# Patient Record
(demographics unavailable — no encounter records)

---

## 2025-02-25 NOTE — IMMUNIZATIONS
[Immunizations are up to date] : Immunizations are up to date [Records maintained by PMISRAEL] : Records maintained by DOMINIK

## 2025-02-25 NOTE — IMMUNIZATIONS
Regarding medication  Trazodone 50 mg once a night patients sister is calling needs this medication called into patients pharmacy     Patients sister said [facility] did not have this medication ready when she picked up patients other medications and pharmacy did not have on file     Pharmacy in [facility]    Please call Cindy when medication is called over    [Immunizations are up to date] : Immunizations are up to date [Records maintained by PMISRAEL] : Records maintained by DOMINIK

## 2025-02-26 NOTE — PHYSICAL EXAM
[PERRLA] : HIRA [S1, S2 Present] : S1, S2 present [Clear to auscultation] : clear to auscultation [Soft] : soft [NonTender] : non tender [Non Distended] : non distended [Normal Bowel Sounds] : normal bowel sounds [No Hepatosplenomegaly] : no hepatosplenomegaly [No Abnormal Lymph Nodes Palpated] : no abnormal lymph nodes palpated [Range Of Motion] : full range of motion [Intact Judgement] : intact judgement  [Insight Insight] : intact insight [Not Examined] : not examined [Refer to Joint Diagram Below] : refer to joint diagram below [Cranial nerves grossly intact] : cranial nerves grossly intact [_______] : Left TMJ: [unfilled] [Acute distress] : no acute distress [Erythematous Conjunctiva] : nonerythematous conjunctiva [Erythematous Oropharynx] : nonerythematous oropharynx [Lesions] : no lesions [Murmurs] : no murmurs [Joint effusions] : no joint effusions [FreeTextEntry1] : appears well  [de-identified] : FROM, no tenderness [de-identified] : no tenderness [de-identified] : no tenderness [de-identified] : no tenderness [de-identified] : tenderness at left quadriceps insertion at patella (2 o'clock) [de-identified] : full forward flexion [de-identified] : no gross discrepancy [de-identified] : none noted

## 2025-02-26 NOTE — HISTORY OF PRESENT ILLNESS
[Noncontributory] : The patient's family history was noncontributory [Unlimited ADLs] : able to do activities of daily living without limitations [Unlimited Sports] : able to participate in sports without limitations [FreeTextEntry1] : Ade is a 17-year-old female who presents for evaluation of positive REYES. She is accompanied by mother and adult sister, who translates for mother (Azeri-speaking).   Ade has been having intermittent joint pain for several years. She has attributed this to being very active and playing a lot of sports. She has had intermittent pain in her back, hips, knees, and ankles. Sometimes she has pain in shoulders. For the last few months, pain seemed to worsen, especially in the last month. No swelling of joints but does endorse AM stiffness in both knees for about 10 minutes. Motrin does not help with pain. Pain has affected her ability to play sports, she pushes through and plays but has had to take breaks while playing. She runs track and also does volleyball. Sometimes she has pain in her hips, when trying to put on pants. No history of fracture or other severe injuries.  Reports doing PT several years ago for an injury.   She was seen by orthopedics (Dr Gary Benson, [facility]) x-rays were normal, but Ade reports that MRI of left knee and both ankles showed 'inflammation' and 'fluid with left knee' with 'tendonitis' (no report or images for review today). She was prescribed an anti-inflammatory (cannot remember name) and PT (has not yet started). She took the anti-inflammatory for 1 week but stopped it because she felt it did not help and worried about developing GI side effects.   Najma also notes a diagnosis of TMJ disorder of left jaw (diagnosed about 4 years ago) - no treatment or mouth guards done. No jaw pain or difficulty chewing.   Labs done on 1/24/25: REYES 1:640 and rest of labs negative/normal - CBCd, CMP, total cholesterol, ESR 6, dsDNA, Smith, Scl70, SS-A52, SS-A60, SS-B.   +heartburn, nausea with eating, shortness of breath when laying in bed; No fever, headache, visual changes, mouth sores, cough, congestion, chest pain, difficulty breathing with exertion, vomiting, diarrhea, constipation, blood in the stool, abdominal pain, dysuria, hematuria, joint swelling, or rash.    Past Medical History: None Past Surgical History: None  Family History: Maternal grandmother - osteoarthritis  Social History: 12th grade. Lives with parents and brother.  Medications: Omeprazole daily, vitamin D  Allergies: NKDA

## 2025-02-26 NOTE — PHYSICAL EXAM
[PERRLA] : HIRA [S1, S2 Present] : S1, S2 present [Clear to auscultation] : clear to auscultation [Soft] : soft [NonTender] : non tender [Non Distended] : non distended [Normal Bowel Sounds] : normal bowel sounds [No Hepatosplenomegaly] : no hepatosplenomegaly [No Abnormal Lymph Nodes Palpated] : no abnormal lymph nodes palpated [Range Of Motion] : full range of motion [Intact Judgement] : intact judgement  [Insight Insight] : intact insight [Not Examined] : not examined [Refer to Joint Diagram Below] : refer to joint diagram below [Cranial nerves grossly intact] : cranial nerves grossly intact [_______] : Left TMJ: [unfilled] [Acute distress] : no acute distress [Erythematous Conjunctiva] : nonerythematous conjunctiva [Erythematous Oropharynx] : nonerythematous oropharynx [Lesions] : no lesions [Murmurs] : no murmurs [Joint effusions] : no joint effusions [FreeTextEntry1] : appears well  [de-identified] : FROM, no tenderness [de-identified] : no tenderness [de-identified] : no tenderness [de-identified] : no tenderness [de-identified] : tenderness at left quadriceps insertion at patella (2 o'clock) [de-identified] : full forward flexion [de-identified] : no gross discrepancy [de-identified] : none noted

## 2025-02-26 NOTE — SOCIAL HISTORY
[Parent(s)] : parent(s) [___ Brothers] : [unfilled] brothers [Grade:  _____] : Grade: [unfilled] [FreeTextEntry1] : Plans to study criminal justice in college

## 2025-02-26 NOTE — CONSULT LETTER
[Dear  ___] : Dear  [unfilled], [Courtesy Letter:] : I had the pleasure of seeing your patient, [unfilled], in my office today. [Please see my note below.] : Please see my note below. [Consult Closing:] : Thank you very much for allowing me to participate in the care of this patient.  If you have any questions, please do not hesitate to contact me. [Sincerely,] : Sincerely, [FreeTextEntry2] : Tatyana Btets MD [id number] [place] [address] [FreeTextEntry3] : Divine Hampton MD Attending Physician, Pediatric Rheumatology [facility] | [facility]

## 2025-02-26 NOTE — CONSULT LETTER
[Dear  ___] : Dear  [unfilled], [Courtesy Letter:] : I had the pleasure of seeing your patient, [unfilled], in my office today. [Please see my note below.] : Please see my note below. [Consult Closing:] : Thank you very much for allowing me to participate in the care of this patient.  If you have any questions, please do not hesitate to contact me. [Sincerely,] : Sincerely, [FreeTextEntry2] : Tatyana Betts MD [id number] [place] [address] [FreeTextEntry3] : Divine Hampton MD Attending Physician, Pediatric Rheumatology [facility] | [facility]

## 2025-02-26 NOTE — REASON FOR VISIT
[Consultation: ________] : [unfilled] is a new patient being seen for a [unfilled] consultation visit [Patient] : patient [Mother] : mother [Family Member] : family member [Medical Records] : medical records

## 2025-02-26 NOTE — HISTORY OF PRESENT ILLNESS
[Noncontributory] : The patient's family history was noncontributory [Unlimited ADLs] : able to do activities of daily living without limitations [Unlimited Sports] : able to participate in sports without limitations [FreeTextEntry1] : Ade is a 17-year-old female who presents for evaluation of positive REYES. She is accompanied by mother and adult sister, who translates for mother (Vietnamese-speaking).   Ade has been having intermittent joint pain for several years. She has attributed this to being very active and playing a lot of sports. She has had intermittent pain in her back, hips, knees, and ankles. Sometimes she has pain in shoulders. For the last few months, pain seemed to worsen, especially in the last month. No swelling of joints but does endorse AM stiffness in both knees for about 10 minutes. Motrin does not help with pain. Pain has affected her ability to play sports, she pushes through and plays but has had to take breaks while playing. She runs track and also does volleyball. Sometimes she has pain in her hips, when trying to put on pants. No history of fracture or other severe injuries.  Reports doing PT several years ago for an injury.   She was seen by orthopedics (Dr Gary Benson, [facility]) x-rays were normal, but Ade reports that MRI of left knee and both ankles showed 'inflammation' and 'fluid with left knee' with 'tendonitis' (no report or images for review today). She was prescribed an anti-inflammatory (cannot remember name) and PT (has not yet started). She took the anti-inflammatory for 1 week but stopped it because she felt it did not help and worried about developing GI side effects.   Najma also notes a diagnosis of TMJ disorder of left jaw (diagnosed about 4 years ago) - no treatment or mouth guards done. No jaw pain or difficulty chewing.   Labs done on 1/24/25: REYES 1:640 and rest of labs negative/normal - CBCd, CMP, total cholesterol, ESR 6, dsDNA, Smith, Scl70, SS-A52, SS-A60, SS-B.   +heartburn, nausea with eating, shortness of breath when laying in bed; No fever, headache, visual changes, mouth sores, cough, congestion, chest pain, difficulty breathing with exertion, vomiting, diarrhea, constipation, blood in the stool, abdominal pain, dysuria, hematuria, joint swelling, or rash.    Past Medical History: None Past Surgical History: None  Family History: Maternal grandmother - osteoarthritis  Social History: 12th grade. Lives with parents and brother.  Medications: Omeprazole daily, vitamin D  Allergies: NKDA

## 2025-04-10 NOTE — END OF VISIT
[] : Fellow [FreeTextEntry3] : 16yo F with b/l knee pain (L>>R) mostly during/after activity although reports sometimes persistent. Taking celebrex BID without much relief; completed PT x6 weeks without much relief. Able to participate in track and volleyball without difficulty; wears compressive sleeve around knee for support. Some pain with walking down stairs/squatting. No AM stiffness, swelling or difficulty with ADLs. PE +grind test b/l chronic hypertrophy of L. knee without effusion or limitation A/P: anterior patellofemoral syndrome and quadriceps tendonitis reviewed with family. No acute arthritis on examination and previous MR showed physiologic fluid without evidence of synovitis/effusion. We discussed mechanical etiology for pain. Encouraged rest, ice/heat, NSAIDs, and PT if desire to go back. Will trial nabumetone BID in place of Celebrex BID. Low suspicion for arthritis but advised to return for re-evaluation in 4 weeks. May consider IAC if symptoms persist/swelling/etc. Family verbalized understanding of plan.  [Time Spent: ___ minutes] : I have spent [unfilled] minutes of time on the encounter which excludes teaching and separately reported services.

## 2025-04-10 NOTE — CONSULT LETTER
[Dear  ___] : Dear  [unfilled], [Courtesy Letter:] : I had the pleasure of seeing your patient, [unfilled], in my office today. [Please see my note below.] : Please see my note below. [Consult Closing:] : Thank you very much for allowing me to participate in the care of this patient.  If you have any questions, please do not hesitate to contact me. [Sincerely,] : Sincerely, [FreeTextEntry2] : Tatyana Betts MD [id number] [place] [address] [FreeTextEntry3] : MD [facility]  Pediatric Rheumatology Fellow

## 2025-04-10 NOTE — REVIEW OF SYSTEMS
[NI] : Endocrine [Nl] : Hematologic/Lymphatic [Shortness of Breath] : shortness of breath [Joint Pains] : arthralgias [Joint Swelling] : no joint swelling [AM Stiffness] : no am stiffness

## 2025-04-10 NOTE — DATA REVIEWED
[FreeTextEntry1] : 2/25/25 labs:  HLA B27, TTG IgG/IgGA, BEULAH, C3, C4, IgA, Lyme, ESR, CRP, TSH, anti thyroid Ab, CCP, RF and Quanitferon within normal limits   2/7/25  MRI Left knee  Mild insertional quadriceps and proximal patellar tendonitis. Trace physiologic joint fluid.

## 2025-04-10 NOTE — PHYSICAL EXAM
[PERRLA] : HIRA [S1, S2 Present] : S1, S2 present [Clear to auscultation] : clear to auscultation [Soft] : soft [NonTender] : non tender [Non Distended] : non distended [Normal Bowel Sounds] : normal bowel sounds [No Hepatosplenomegaly] : no hepatosplenomegaly [Refer to Joint Diagram Below] : refer to joint diagram below [Range Of Motion] : full range of motion [Cranial nerves grossly intact] : cranial nerves grossly intact [Intact Judgement] : intact judgement  [Insight Insight] : intact insight [Not Examined] : not examined [_______] : Left TMJ: [unfilled] [Cardiac Auscultation] : normal cardiac auscultation  [Peripheral Pulses] : positive peripheral pulses [Respiratory Effort] : normal respiratory effort [Palpated at following regions:] : palpated at following regions: [Post Auricular] : post auricular [Pronated flat feet] : pronated flat feet [Acute distress] : no acute distress [Rash] : no rash [Erythematous Conjunctiva] : nonerythematous conjunctiva [Erythematous Oropharynx] : nonerythematous oropharynx [Lesions] : no lesions [Murmurs] : no murmurs [Peripheral Edema] : no peripheral edema  [Joint effusions] : no joint effusions [FreeTextEntry1] : appears well  [de-identified] : piezogenic papules on medial rght foot  [de-identified] : flex pain, pain with palpation of tendon, GILBERT left knee hurts. right knee fine. left knee, minimal fluid.  [de-identified] : tenderness at left and right quadriceps insertion at patella (2 o'clock) [de-identified] : no gross discrepancy [de-identified] : none noted

## 2025-04-10 NOTE — REASON FOR VISIT
[Follow-Up: _____] : [unfilled] is  being seen for a [unfilled] follow-up visit [Patient] : patient [Mother] : mother [Medical Records] : medical records [Time Spent: ____ minutes] : Total time spent using  services: [unfilled] minutes. The patient's primary language is not English thus required  services. [Interpreters_IDNumber] : [id number] [Interpreters_FullName] : Miguel  [TWNoteComboBox1] : [place]

## 2025-04-10 NOTE — HISTORY OF PRESENT ILLNESS
[Noncontributory] : The patient's family history was noncontributory [Unlimited ADLs] : able to do activities of daily living without limitations [Unlimited Sports] : able to participate in sports without limitations [FreeTextEntry1] : Since last seen on 2/25/25:  Started Celebrex 100mg BID when last seen, taking daily. Feels the same as when she was first seen. Few missed doses. In the beginning may have helped more. Pain in knees and ankles, no swelling. Still able to do ADLs. Sometimes in so much pain that she cannot go to school, needs help from mom.  Pain comes and goes, present for a week, goes away for 2 days then comes back Running makes it worse  PT ended last week, completed a 6 week course. Did not help, hurt more the next day. Was doing the home exercises daily.  Squatting hurts, going down stairs hurts. long periods of running help. pain persists despite rest. worse in AM. Recent sinus infection, no antibiotics.  Saw Podiatry 1-2 weeks ago, XR performed, showed inflammation of feet - patient says that she was told she has flat feet and was given an orthotic which is currently in place her her Uggs that she is wearing today.  [DurMorningStiffness] : 0

## 2025-05-21 NOTE — PHYSICAL EXAM
[PERRLA] : HIRA [S1, S2 Present] : S1, S2 present [Cardiac Auscultation] : normal cardiac auscultation  [Peripheral Pulses] : positive peripheral pulses [Respiratory Effort] : normal respiratory effort [Clear to auscultation] : clear to auscultation [Soft] : soft [NonTender] : non tender [Non Distended] : non distended [Normal Bowel Sounds] : normal bowel sounds [No Hepatosplenomegaly] : no hepatosplenomegaly [Palpated at following regions:] : palpated at following regions: [Post Auricular] : post auricular [Refer to Joint Diagram Below] : refer to joint diagram below [Range Of Motion] : full range of motion [Cranial nerves grossly intact] : cranial nerves grossly intact [Intact Judgement] : intact judgement  [Insight Insight] : intact insight [Not Examined] : not examined [_______] : Left TMJ: [unfilled] [Pronated flat feet] : pronated flat feet [Acute distress] : no acute distress [Rash] : no rash [Erythematous Conjunctiva] : nonerythematous conjunctiva [Erythematous Oropharynx] : nonerythematous oropharynx [Lesions] : no lesions [Murmurs] : no murmurs [Peripheral Edema] : no peripheral edema  [Joint effusions] : no joint effusions [FreeTextEntry1] : appears well  [de-identified] : piezogenic papules on medial rght foot  [de-identified] : flex pain, pain with palpation of tendon, GILBERT left knee hurts. right knee fine. left knee, minimal fluid.  [de-identified] : tenderness at left and right quadriceps insertion at patella (2 o'clock) [de-identified] : no gross discrepancy [de-identified] : none noted

## 2025-05-21 NOTE — REASON FOR VISIT
[Follow-Up: _____] : [unfilled] is  being seen for a [unfilled] follow-up visit [Patient] : patient [Mother] : mother [Medical Records] : medical records [Interpreters_IDNumber] : [id number] [Interpreters_FullName] : Miguel  [TWNoteComboBox1] : [place]

## 2025-05-21 NOTE — HISTORY OF PRESENT ILLNESS
[Noncontributory] : The patient's family history was noncontributory [Unlimited ADLs] : able to do activities of daily living without limitations [Unlimited Sports] : able to participate in sports without limitations [FreeTextEntry1] : Since last seen on 4/10/25:  Started Celebrex 100mg BID when last seen, taking daily. Feels the same as when she was first seen. Few missed doses. In the beginning may have helped more. Pain in knees and ankles, no swelling. Still able to do ADLs. Sometimes in so much pain that she cannot go to school, needs help from mom.  Pain comes and goes, present for a week, goes away for 2 days then comes back Running makes it worse  PT ended last week, completed a 6 week course. Did not help, hurt more the next day. Was doing the home exercises daily.  Squatting hurts, going down stairs hurts. long periods of running help. pain persists despite rest. worse in AM. Recent sinus infection, no antibiotics.  Saw Podiatry 1-2 weeks ago, XR performed, showed inflammation of feet - patient says that she was told she has flat feet and was given an orthotic which is currently in place her her Uggs that she is wearing today.  [DurMorningStiffness] : 0

## 2025-05-21 NOTE — CONSULT LETTER
[Dear  ___] : Dear  [unfilled], [Courtesy Letter:] : I had the pleasure of seeing your patient, [unfilled], in my office today. [Please see my note below.] : Please see my note below. [Consult Closing:] : Thank you very much for allowing me to participate in the care of this patient.  If you have any questions, please do not hesitate to contact me. [Sincerely,] : Sincerely, [FreeTextEntry2] : Tatyana Betts MD [id number] [place] [address] [FreeTextEntry3] : MD [facility]  Pediatric Rheumatology Fellow